# Patient Record
Sex: MALE | Race: WHITE | NOT HISPANIC OR LATINO | Employment: FULL TIME | URBAN - METROPOLITAN AREA
[De-identification: names, ages, dates, MRNs, and addresses within clinical notes are randomized per-mention and may not be internally consistent; named-entity substitution may affect disease eponyms.]

---

## 2017-08-29 DIAGNOSIS — M25.532 LEFT WRIST PAIN: Primary | ICD-10-CM

## 2017-08-30 ENCOUNTER — OFFICE VISIT (OUTPATIENT)
Dept: ORTHOPEDICS | Facility: CLINIC | Age: 47
End: 2017-08-30
Payer: COMMERCIAL

## 2017-08-30 ENCOUNTER — HOSPITAL ENCOUNTER (OUTPATIENT)
Dept: RADIOLOGY | Facility: HOSPITAL | Age: 47
Discharge: HOME | End: 2017-08-30
Attending: ORTHOPAEDIC SURGERY
Payer: COMMERCIAL

## 2017-08-30 VITALS — BODY MASS INDEX: 25.73 KG/M2 | HEIGHT: 72 IN | WEIGHT: 190 LBS

## 2017-08-30 DIAGNOSIS — M25.532 LEFT WRIST PAIN: ICD-10-CM

## 2017-08-30 DIAGNOSIS — M77.8 LEFT WRIST TENDINITIS: Primary | ICD-10-CM

## 2017-08-30 PROCEDURE — 73110 X-RAY EXAM OF WRIST: CPT | Mod: 26,LT,, | Performed by: RADIOLOGY

## 2017-08-30 PROCEDURE — 3008F BODY MASS INDEX DOCD: CPT | Mod: S$GLB,,, | Performed by: ORTHOPAEDIC SURGERY

## 2017-08-30 PROCEDURE — 99999 PR PBB SHADOW E&M-EST. PATIENT-LVL II: CPT | Mod: PBBFAC,,, | Performed by: ORTHOPAEDIC SURGERY

## 2017-08-30 PROCEDURE — 99203 OFFICE O/P NEW LOW 30 MIN: CPT | Mod: S$GLB,,, | Performed by: ORTHOPAEDIC SURGERY

## 2017-08-30 PROCEDURE — 73110 X-RAY EXAM OF WRIST: CPT | Mod: TC,PN,LT

## 2017-08-31 NOTE — PROGRESS NOTES
DATE: 8/30/2017  PATIENT: Stephen Deleon  REFERRING MD:   CHIEF COMPLAINT:   Chief Complaint   Patient presents with    Left Wrist - Pain       HISTORY:  Stephen Deleon is a 47 y.o. right hand dominant male  who presents for initial evaluation of left wrist pain.  He is employed as a .  He notes it 2 month history of discomfort which developed after using a weed eater for approximately 3 hours.  Since that time he's had random episodes of sharp stabbing pain on the dorsal radial wrist.  He states it occurs with extension and radial deviation.  It is irregular and not predictable.  It also occurs at sleep.  It last for a few seconds and then goes away.  He denies any previous injuries to his wrist.  He is not taking any medication for this problem.  Pain is reported at 0/10 currently    PAST MEDICAL/SURGICAL HISTORY:  History reviewed. No pertinent past medical history.  History reviewed. No pertinent surgical history.    Current Medications: No current outpatient prescriptions on file.    Family History: Noncontributory  Social History:   Social History     Social History    Marital status: Single     Spouse name: N/A    Number of children: N/A    Years of education: N/A     Occupational History    Not on file.     Social History Main Topics    Smoking status: Not on file    Smokeless tobacco: Not on file    Alcohol use Not on file    Drug use: Unknown    Sexual activity: Not on file     Other Topics Concern    Not on file     Social History Narrative    No narrative on file       ROS:  Constitution: Negative for chills, fever, and sweats. Negative for unexplained weight loss.  HENT: Negative for headaches and blurry vision.   Cardiovascular: Negative for chest pain, irregular heartbeat, leg swelling and palpitations.   Respiratory: Negative for cough and shortness of breath.   Gastrointestinal: Negative for abdominal pain, heartburn, nausea and vomiting.   Genitourinary: Negative  for bladder incontinence and dysuria.   Musculoskeletal: Negative for systemic arthritis, joint swelling, muscle weakness and myalgias.   Neurological: Negative for numbness.   Psychiatric/Behavioral: Negative for depression.   Endocrine: Negative for polyuria.   Hematologic/Lymphatic: Negative for bleeding disorders.  Skin: Negative for poor wound healing.       PHYSICAL EXAM:  Ht 6' (1.829 m)   Wt 86.2 kg (190 lb)   BMI 25.77 kg/m²   Healthy-appearing male in no acute distress.  Examination left wrist reveals no ecchymosis, swelling or effusion.  Range of motion of the wrist is full to flexion, extension, supination and pronation.  Sensation is intact to the radial, ulnar, median nerve distribution of the hand.  Motor strength within normal limits.  Negative Finkelstein's test.  Mild tenderness over the ECRL.  No pain with resisted wrist extension.  No pop or click elicited with range of motion of the wrist.      IMAGING:   X-rays a left wrist are performed and personally reviewed and compared to the radiologist's report.  No acute fractures, dislocations, bony or soft tissue abnormalities noted     ASSESSMENT:   Extensor carpi radialis longus tendinitis left wrist    PLAN:  The nature of the diagnosis, using models and diagrams when appropriate, was explained to the patient in detail.Treatment option discussed included observation, cortisone injection, wrist splinting, and MRI.. All questions answered and the patient wishes to observe his symptoms over the present time.  He'll try to identify the offending activity.  I recommended using a wrist brace at night to sleep as he reports this wasn't bothersome most.  Should he continue to remain symptomatic in 3-4 weeks, he'll return for consideration of cortisone injection versus MRI.  Follow-up if not improving or worse..      This note was dictated using voice recognition software and may contain grammatical errors

## 2019-08-30 DIAGNOSIS — K40.90 BUBONOCELE: Primary | ICD-10-CM

## 2019-09-04 ENCOUNTER — HOSPITAL ENCOUNTER (OUTPATIENT)
Dept: RADIOLOGY | Facility: HOSPITAL | Age: 49
Discharge: HOME | End: 2019-09-04
Attending: FAMILY MEDICINE
Payer: COMMERCIAL

## 2019-09-04 DIAGNOSIS — K40.90 BUBONOCELE: ICD-10-CM

## 2019-09-04 PROCEDURE — 76857 US EXAM PELVIC LIMITED: CPT | Mod: TC,PO

## 2019-09-09 ENCOUNTER — TELEPHONE (OUTPATIENT)
Dept: FAMILY MEDICINE | Facility: CLINIC | Age: 49
End: 2019-09-09

## 2019-09-09 NOTE — TELEPHONE ENCOUNTER
----- Message from Juanito Portillo MD sent at 9/8/2019  3:04 PM CDT -----  Call patient.  He does not have any inguinal hernias at this time.  Ultrasound shows 2 normal right inguinal lymph nodes.  No surgery is required at this time.

## 2019-09-10 NOTE — TELEPHONE ENCOUNTER
Spoke with pt and let him know per Dr. Portillo that the u/s does not show any hernias, just normal nodules. Pt verbalized understanding.

## 2019-09-13 ENCOUNTER — TELEPHONE (OUTPATIENT)
Dept: FAMILY MEDICINE | Facility: CLINIC | Age: 49
End: 2019-09-13

## 2019-09-13 NOTE — TELEPHONE ENCOUNTER
----- Message from Naty Valverde sent at 9/13/2019 10:32 AM CDT -----  Contact: Stephen  Patsy wants to be sen today. Body aches, head and chest congestion. [facility]  on .  pts # [phone]

## 2020-03-02 ENCOUNTER — OFFICE VISIT (OUTPATIENT)
Dept: FAMILY MEDICINE | Facility: CLINIC | Age: 50
End: 2020-03-02
Payer: COMMERCIAL

## 2020-03-02 VITALS
WEIGHT: 198 LBS | SYSTOLIC BLOOD PRESSURE: 108 MMHG | BODY MASS INDEX: 26.82 KG/M2 | DIASTOLIC BLOOD PRESSURE: 70 MMHG | HEIGHT: 72 IN | HEART RATE: 64 BPM

## 2020-03-02 DIAGNOSIS — Z00.00 ROUTINE PHYSICAL EXAMINATION: Primary | ICD-10-CM

## 2020-03-02 DIAGNOSIS — Z12.11 SCREENING FOR COLON CANCER: ICD-10-CM

## 2020-03-02 PROCEDURE — 99396 PR PREVENTIVE VISIT,EST,40-64: ICD-10-PCS | Mod: S$GLB,,, | Performed by: PHYSICIAN ASSISTANT

## 2020-03-02 PROCEDURE — 99396 PREV VISIT EST AGE 40-64: CPT | Mod: S$GLB,,, | Performed by: PHYSICIAN ASSISTANT

## 2020-03-02 NOTE — PROGRESS NOTES
SUBJECTIVE:    Patient ID: Stephen Deleon is a 50 y.o. male.    Chief Complaint: Follow-up (no medications //VM)    50-year-old male presents today for regular checkup. Reports that he has been doing well overall. Stays active. Knows that he is due for screening c-scope and PSA now. He has no new concerns for himself at this time. Now caring more for his parents who are declining physically.      No visits with results within 6 Month(s) from this visit.   Latest known visit with results is:   No results found for any previous visit.       History reviewed. No pertinent past medical history.  History reviewed. No pertinent surgical history.  History reviewed. No pertinent family history.    Marital Status:   Alcohol History:  reports that he drinks alcohol.  Tobacco History:  reports that he has never smoked. He does not have any smokeless tobacco history on file.  Drug History:  reports that he does not use drugs.    Review of patient's allergies indicates:  No Known Allergies  No current outpatient medications on file.    Review of Systems   Constitutional: Negative for activity change, fatigue, fever and unexpected weight change.   HENT: Negative for congestion.    Respiratory: Negative for apnea, cough, chest tightness and shortness of breath.    Cardiovascular: Negative for chest pain and palpitations.   Gastrointestinal: Negative for abdominal distention and abdominal pain.   Genitourinary: Negative for difficulty urinating and dysuria.   Musculoskeletal: Negative for arthralgias and back pain.   Neurological: Negative for dizziness and weakness.          Objective:      Vitals:    03/02/20 0837   BP: 108/70   Pulse: 64   Weight: 89.8 kg (198 lb)   Height: 6' (1.829 m)     Physical Exam   Constitutional: He is oriented to person, place, and time. He appears well-developed and well-nourished. No distress.   HENT:   Head: Normocephalic and atraumatic.   Eyes: Pupils are equal, round, and reactive to  light.   Neck: Normal range of motion. Neck supple. No thyromegaly present.   Cardiovascular: Normal rate, regular rhythm, normal heart sounds and intact distal pulses.   Pulmonary/Chest: Effort normal and breath sounds normal.   Abdominal: Soft. Bowel sounds are normal. He exhibits no distension. There is no tenderness.   Musculoskeletal: Normal range of motion.   Neurological: He is alert and oriented to person, place, and time. No cranial nerve deficit.   Skin: Skin is warm and dry. No rash noted. No erythema.         Assessment:       1. Routine physical examination    2. Screening for colon cancer         Plan:       Routine physical examination  -     CBC auto differential; Future; Expected date: 03/02/2020  -     Comprehensive metabolic panel; Future; Expected date: 03/02/2020  -     Lipid panel; Future; Expected date: 03/02/2020  -     TSH w/reflex to FT4; Future; Expected date: 03/02/2020  -     Urinalysis, Reflex to Urine Culture Urine, Clean Catch; Future; Expected date: 03/02/2020  -     PSA, Screening; Future; Expected date: 03/02/2020    Screening for colon cancer  -     Ambulatory referral/consult to Gastroenterology; Future; Expected date: 03/09/2020      Follow up in about 1 year (around 3/2/2021) for Annual Physical.        3/2/2020 Donta Urbina PA-C

## 2020-03-02 NOTE — PATIENT INSTRUCTIONS

## 2020-03-04 LAB
ALBUMIN SERPL-MCNC: 4.4 G/DL (ref 3.6–5.1)
ALBUMIN/GLOB SERPL: 1.8 (CALC) (ref 1–2.5)
ALP SERPL-CCNC: 92 U/L (ref 35–144)
ALT SERPL-CCNC: 19 U/L (ref 9–46)
APPEARANCE UR: CLEAR
AST SERPL-CCNC: 16 U/L (ref 10–35)
BACTERIA #/AREA URNS HPF: NORMAL /HPF
BACTERIA UR CULT: NORMAL
BASOPHILS # BLD AUTO: 18 CELLS/UL (ref 0–200)
BASOPHILS NFR BLD AUTO: 0.3 %
BILIRUB SERPL-MCNC: 0.7 MG/DL (ref 0.2–1.2)
BILIRUB UR QL STRIP: NEGATIVE
BUN SERPL-MCNC: 19 MG/DL (ref 7–25)
BUN/CREAT SERPL: NORMAL (CALC) (ref 6–22)
CALCIUM SERPL-MCNC: 9.4 MG/DL (ref 8.6–10.3)
CHLORIDE SERPL-SCNC: 104 MMOL/L (ref 98–110)
CHOLEST SERPL-MCNC: 210 MG/DL
CHOLEST/HDLC SERPL: 4 (CALC)
CO2 SERPL-SCNC: 29 MMOL/L (ref 20–32)
COLOR UR: YELLOW
CREAT SERPL-MCNC: 1 MG/DL (ref 0.7–1.33)
EOSINOPHIL # BLD AUTO: 150 CELLS/UL (ref 15–500)
EOSINOPHIL NFR BLD AUTO: 2.5 %
ERYTHROCYTE [DISTWIDTH] IN BLOOD BY AUTOMATED COUNT: 12.4 % (ref 11–15)
GFRSERPLBLD MDRD-ARVRAT: 87 ML/MIN/1.73M2
GLOBULIN SER CALC-MCNC: 2.4 G/DL (CALC) (ref 1.9–3.7)
GLUCOSE SERPL-MCNC: 91 MG/DL (ref 65–99)
GLUCOSE UR QL STRIP: NEGATIVE
HCT VFR BLD AUTO: 47 % (ref 38.5–50)
HDLC SERPL-MCNC: 52 MG/DL
HGB BLD-MCNC: 15.1 G/DL (ref 13.2–17.1)
HGB UR QL STRIP: NEGATIVE
HYALINE CASTS #/AREA URNS LPF: NORMAL /LPF
KETONES UR QL STRIP: NEGATIVE
LDLC SERPL CALC-MCNC: 139 MG/DL (CALC)
LEUKOCYTE ESTERASE UR QL STRIP: NEGATIVE
LYMPHOCYTES # BLD AUTO: 1998 CELLS/UL (ref 850–3900)
LYMPHOCYTES NFR BLD AUTO: 33.3 %
MCH RBC QN AUTO: 29.2 PG (ref 27–33)
MCHC RBC AUTO-ENTMCNC: 32.1 G/DL (ref 32–36)
MCV RBC AUTO: 90.9 FL (ref 80–100)
MONOCYTES # BLD AUTO: 516 CELLS/UL (ref 200–950)
MONOCYTES NFR BLD AUTO: 8.6 %
NEUTROPHILS # BLD AUTO: 3318 CELLS/UL (ref 1500–7800)
NEUTROPHILS NFR BLD AUTO: 55.3 %
NITRITE UR QL STRIP: NEGATIVE
NONHDLC SERPL-MCNC: 158 MG/DL (CALC)
PH UR STRIP: NORMAL [PH] (ref 5–8)
PLATELET # BLD AUTO: 155 THOUSAND/UL (ref 140–400)
PMV BLD REES-ECKER: 11.5 FL (ref 7.5–12.5)
POTASSIUM SERPL-SCNC: 4.2 MMOL/L (ref 3.5–5.3)
PROT SERPL-MCNC: 6.8 G/DL (ref 6.1–8.1)
PROT UR QL STRIP: NEGATIVE
PSA SERPL-MCNC: 0.4 NG/ML
RBC # BLD AUTO: 5.17 MILLION/UL (ref 4.2–5.8)
RBC #/AREA URNS HPF: NORMAL /HPF
SODIUM SERPL-SCNC: 142 MMOL/L (ref 135–146)
SP GR UR STRIP: 1.02 (ref 1–1.03)
SQUAMOUS #/AREA URNS HPF: NORMAL /HPF
TRIGL SERPL-MCNC: 90 MG/DL
TSH SERPL-ACNC: 1.94 MIU/L (ref 0.4–4.5)
WBC # BLD AUTO: 6 THOUSAND/UL (ref 3.8–10.8)
WBC #/AREA URNS HPF: NORMAL /HPF

## 2020-03-06 ENCOUNTER — TELEPHONE (OUTPATIENT)
Dept: FAMILY MEDICINE | Facility: CLINIC | Age: 50
End: 2020-03-06

## 2020-03-06 NOTE — TELEPHONE ENCOUNTER
----- Message from Donta Urbina PA-C sent at 3/5/2020  7:52 PM CST -----  Labs all look stable at this time. Continue as is and call me with any further questions. Keep up the good work!

## 2020-04-01 ENCOUNTER — TELEPHONE (OUTPATIENT)
Dept: FAMILY MEDICINE | Facility: CLINIC | Age: 50
End: 2020-04-01

## 2020-04-01 NOTE — TELEPHONE ENCOUNTER
Patient called back. Encouraged him to use portal and [employer]. Confirmed his email and sent link for portal. He also downloaded [employer] kimberly while we were on the phone.

## 2020-04-01 NOTE — TELEPHONE ENCOUNTER
From overdue results-encouraging patient to use portal due to Covid-19. LMOR to call Erica LOZANO, that we are encouraging use of portal and [employer] kimberly, and that we are doing virtual visits from the kimberly on a smart phone if it was warranted. Asked him to call so that I can give him info to set this up.

## 2021-05-24 DIAGNOSIS — M25.561 RIGHT KNEE PAIN, UNSPECIFIED CHRONICITY: Primary | ICD-10-CM

## 2021-05-31 ENCOUNTER — HOSPITAL ENCOUNTER (OUTPATIENT)
Dept: RADIOLOGY | Facility: HOSPITAL | Age: 51
Discharge: HOME | End: 2021-05-31
Attending: ORTHOPAEDIC SURGERY
Payer: COMMERCIAL

## 2021-05-31 ENCOUNTER — OFFICE VISIT (OUTPATIENT)
Dept: ORTHOPEDICS | Facility: CLINIC | Age: 51
End: 2021-05-31
Payer: COMMERCIAL

## 2021-05-31 VITALS — WEIGHT: 198 LBS | RESPIRATION RATE: 16 BRPM | HEIGHT: 72 IN | BODY MASS INDEX: 26.82 KG/M2

## 2021-05-31 DIAGNOSIS — M17.11 UNILATERAL PRIMARY OSTEOARTHRITIS, RIGHT KNEE: Primary | ICD-10-CM

## 2021-05-31 DIAGNOSIS — M25.561 RIGHT KNEE PAIN, UNSPECIFIED CHRONICITY: ICD-10-CM

## 2021-05-31 PROCEDURE — 73564 XR KNEE ORTHO RIGHT WITH FLEXION: ICD-10-PCS | Mod: 26,RT,, | Performed by: RADIOLOGY

## 2021-05-31 PROCEDURE — 73564 X-RAY EXAM KNEE 4 OR MORE: CPT | Mod: TC,PN,RT

## 2021-05-31 PROCEDURE — 99204 OFFICE O/P NEW MOD 45 MIN: CPT | Mod: S$GLB,,, | Performed by: ORTHOPAEDIC SURGERY

## 2021-05-31 PROCEDURE — 73564 X-RAY EXAM KNEE 4 OR MORE: CPT | Mod: 26,RT,, | Performed by: RADIOLOGY

## 2021-05-31 PROCEDURE — 99204 PR OFFICE/OUTPT VISIT, NEW, LEVL IV, 45-59 MIN: ICD-10-PCS | Mod: S$GLB,,, | Performed by: ORTHOPAEDIC SURGERY

## 2021-05-31 PROCEDURE — 73562 XR KNEE ORTHO RIGHT WITH FLEXION: ICD-10-PCS | Mod: 26,LT,, | Performed by: RADIOLOGY

## 2021-05-31 PROCEDURE — 3008F PR BODY MASS INDEX (BMI) DOCUMENTED: ICD-10-PCS | Mod: CPTII,S$GLB,, | Performed by: ORTHOPAEDIC SURGERY

## 2021-05-31 PROCEDURE — 73562 X-RAY EXAM OF KNEE 3: CPT | Mod: 26,LT,, | Performed by: RADIOLOGY

## 2021-05-31 PROCEDURE — 99999 PR PBB SHADOW E&M-EST. PATIENT-LVL II: CPT | Mod: PBBFAC,,, | Performed by: ORTHOPAEDIC SURGERY

## 2021-05-31 PROCEDURE — 99999 PR PBB SHADOW E&M-EST. PATIENT-LVL II: ICD-10-PCS | Mod: PBBFAC,,, | Performed by: ORTHOPAEDIC SURGERY

## 2021-05-31 PROCEDURE — 3008F BODY MASS INDEX DOCD: CPT | Mod: CPTII,S$GLB,, | Performed by: ORTHOPAEDIC SURGERY

## 2021-05-31 RX ORDER — KETOROLAC TROMETHAMINE 10 MG/1
10 TABLET, FILM COATED ORAL EVERY 6 HOURS
Qty: 20 TABLET | Refills: 0 | Status: SHIPPED | OUTPATIENT
Start: 2021-05-31 | End: 2021-06-05

## 2021-05-31 RX ORDER — METHYLPREDNISOLONE 4 MG/1
TABLET ORAL
Qty: 1 PACKAGE | Refills: 0 | Status: SHIPPED | OUTPATIENT
Start: 2021-05-31 | End: 2021-06-21

## 2021-07-01 ENCOUNTER — TELEPHONE (OUTPATIENT)
Dept: FAMILY MEDICINE | Facility: CLINIC | Age: 51
End: 2021-07-01

## 2021-07-01 ENCOUNTER — OFFICE VISIT (OUTPATIENT)
Dept: FAMILY MEDICINE | Facility: CLINIC | Age: 51
End: 2021-07-01
Payer: COMMERCIAL

## 2021-07-01 VITALS
BODY MASS INDEX: 26.82 KG/M2 | HEIGHT: 72 IN | SYSTOLIC BLOOD PRESSURE: 128 MMHG | DIASTOLIC BLOOD PRESSURE: 86 MMHG | WEIGHT: 198 LBS | HEART RATE: 64 BPM

## 2021-07-01 DIAGNOSIS — R10.2 PELVIC PAIN IN MALE: ICD-10-CM

## 2021-07-01 DIAGNOSIS — Z12.5 PROSTATE CANCER SCREENING: ICD-10-CM

## 2021-07-01 DIAGNOSIS — R39.9 LOWER URINARY TRACT SYMPTOMS (LUTS): ICD-10-CM

## 2021-07-01 DIAGNOSIS — Z00.00 GENERAL MEDICAL EXAM: Primary | ICD-10-CM

## 2021-07-01 PROCEDURE — 99213 OFFICE O/P EST LOW 20 MIN: CPT | Mod: S$GLB,,, | Performed by: NURSE PRACTITIONER

## 2021-07-01 PROCEDURE — 3008F PR BODY MASS INDEX (BMI) DOCUMENTED: ICD-10-PCS | Mod: S$GLB,,, | Performed by: NURSE PRACTITIONER

## 2021-07-01 PROCEDURE — 3008F BODY MASS INDEX DOCD: CPT | Mod: S$GLB,,, | Performed by: NURSE PRACTITIONER

## 2021-07-01 PROCEDURE — 1125F PR PAIN SEVERITY QUANTIFIED, PAIN PRESENT: ICD-10-PCS | Mod: S$GLB,,, | Performed by: NURSE PRACTITIONER

## 2021-07-01 PROCEDURE — 1125F AMNT PAIN NOTED PAIN PRSNT: CPT | Mod: S$GLB,,, | Performed by: NURSE PRACTITIONER

## 2021-07-01 PROCEDURE — 99213 PR OFFICE/OUTPT VISIT, EST, LEVL III, 20-29 MIN: ICD-10-PCS | Mod: S$GLB,,, | Performed by: NURSE PRACTITIONER

## 2021-07-03 LAB
ALBUMIN SERPL-MCNC: 4.2 G/DL (ref 3.6–5.1)
ALBUMIN/GLOB SERPL: 1.8 (CALC) (ref 1–2.5)
ALP SERPL-CCNC: 74 U/L (ref 35–144)
ALT SERPL-CCNC: 14 U/L (ref 9–46)
APPEARANCE UR: CLEAR
AST SERPL-CCNC: 15 U/L (ref 10–35)
BACTERIA #/AREA URNS HPF: NORMAL /HPF
BACTERIA UR CULT: NORMAL
BASOPHILS # BLD AUTO: 23 CELLS/UL (ref 0–200)
BASOPHILS NFR BLD AUTO: 0.4 %
BILIRUB SERPL-MCNC: 0.5 MG/DL (ref 0.2–1.2)
BILIRUB UR QL STRIP: NEGATIVE
BUN SERPL-MCNC: 18 MG/DL (ref 7–25)
BUN/CREAT SERPL: NORMAL (CALC) (ref 6–22)
CALCIUM SERPL-MCNC: 9.2 MG/DL (ref 8.6–10.3)
CHLORIDE SERPL-SCNC: 108 MMOL/L (ref 98–110)
CHOLEST SERPL-MCNC: 199 MG/DL
CHOLEST/HDLC SERPL: 4 (CALC)
CO2 SERPL-SCNC: 27 MMOL/L (ref 20–32)
COLOR UR: YELLOW
CREAT SERPL-MCNC: 0.91 MG/DL (ref 0.7–1.33)
EOSINOPHIL # BLD AUTO: 171 CELLS/UL (ref 15–500)
EOSINOPHIL NFR BLD AUTO: 3 %
ERYTHROCYTE [DISTWIDTH] IN BLOOD BY AUTOMATED COUNT: 12.1 % (ref 11–15)
GLOBULIN SER CALC-MCNC: 2.3 G/DL (CALC) (ref 1.9–3.7)
GLUCOSE SERPL-MCNC: 95 MG/DL (ref 65–99)
GLUCOSE UR QL STRIP: NEGATIVE
HCT VFR BLD AUTO: 45 % (ref 38.5–50)
HDLC SERPL-MCNC: 50 MG/DL
HGB BLD-MCNC: 14.9 G/DL (ref 13.2–17.1)
HGB UR QL STRIP: NEGATIVE
HYALINE CASTS #/AREA URNS LPF: NORMAL /LPF
KETONES UR QL STRIP: NEGATIVE
LDLC SERPL CALC-MCNC: 130 MG/DL (CALC)
LEUKOCYTE ESTERASE UR QL STRIP: NEGATIVE
LYMPHOCYTES # BLD AUTO: 2160 CELLS/UL (ref 850–3900)
LYMPHOCYTES NFR BLD AUTO: 37.9 %
MCH RBC QN AUTO: 29.8 PG (ref 27–33)
MCHC RBC AUTO-ENTMCNC: 33.1 G/DL (ref 32–36)
MCV RBC AUTO: 90 FL (ref 80–100)
MONOCYTES # BLD AUTO: 581 CELLS/UL (ref 200–950)
MONOCYTES NFR BLD AUTO: 10.2 %
NEUTROPHILS # BLD AUTO: 2765 CELLS/UL (ref 1500–7800)
NEUTROPHILS NFR BLD AUTO: 48.5 %
NITRITE UR QL STRIP: NEGATIVE
NONHDLC SERPL-MCNC: 149 MG/DL (CALC)
PH UR STRIP: NORMAL [PH] (ref 5–8)
PLATELET # BLD AUTO: 163 THOUSAND/UL (ref 140–400)
PMV BLD REES-ECKER: 11.6 FL (ref 7.5–12.5)
POTASSIUM SERPL-SCNC: 4 MMOL/L (ref 3.5–5.3)
PROT SERPL-MCNC: 6.5 G/DL (ref 6.1–8.1)
PROT UR QL STRIP: NEGATIVE
PSA SERPL-MCNC: 0.4 NG/ML
RBC # BLD AUTO: 5 MILLION/UL (ref 4.2–5.8)
RBC #/AREA URNS HPF: NORMAL /HPF
SODIUM SERPL-SCNC: 141 MMOL/L (ref 135–146)
SP GR UR STRIP: 1.03 (ref 1–1.03)
SQUAMOUS #/AREA URNS HPF: NORMAL /HPF
TRIGL SERPL-MCNC: 86 MG/DL
TSH SERPL-ACNC: 2.52 MIU/L (ref 0.4–4.5)
WBC # BLD AUTO: 5.7 THOUSAND/UL (ref 3.8–10.8)
WBC #/AREA URNS HPF: NORMAL /HPF

## 2021-07-09 ENCOUNTER — TELEPHONE (OUTPATIENT)
Dept: UROLOGY | Facility: CLINIC | Age: 51
End: 2021-07-09

## 2021-07-09 ENCOUNTER — TELEPHONE (OUTPATIENT)
Dept: FAMILY MEDICINE | Facility: CLINIC | Age: 51
End: 2021-07-09

## 2022-06-21 ENCOUNTER — TELEPHONE (OUTPATIENT)
Dept: FAMILY MEDICINE | Facility: CLINIC | Age: 52
End: 2022-06-21

## 2022-06-21 DIAGNOSIS — Z00.00 PHYSICAL EXAM: Primary | ICD-10-CM

## 2022-06-21 NOTE — TELEPHONE ENCOUNTER
----- Message from Catalina Martinez sent at 6/21/2022  9:53 AM CDT -----  Pt calling he scheduled a check up and wants to come maikel morning to do labs for that appt. Would like a call to confirm they have been ordered.  [phone]

## 2022-06-24 LAB
ALBUMIN SERPL-MCNC: 4.2 G/DL (ref 3.6–5.1)
ALBUMIN/GLOB SERPL: 1.9 (CALC) (ref 1–2.5)
ALP SERPL-CCNC: 77 U/L (ref 35–144)
ALT SERPL-CCNC: 14 U/L (ref 9–46)
APPEARANCE UR: CLEAR
AST SERPL-CCNC: 12 U/L (ref 10–35)
BACTERIA #/AREA URNS HPF: NORMAL /HPF
BACTERIA UR CULT: NORMAL
BASOPHILS # BLD AUTO: 17 CELLS/UL (ref 0–200)
BASOPHILS NFR BLD AUTO: 0.3 %
BILIRUB SERPL-MCNC: 0.5 MG/DL (ref 0.2–1.2)
BILIRUB UR QL STRIP: NEGATIVE
BUN SERPL-MCNC: 16 MG/DL (ref 7–25)
BUN/CREAT SERPL: NORMAL (CALC) (ref 6–22)
CALCIUM SERPL-MCNC: 9.3 MG/DL (ref 8.6–10.3)
CHLORIDE SERPL-SCNC: 106 MMOL/L (ref 98–110)
CHOLEST SERPL-MCNC: 194 MG/DL
CHOLEST/HDLC SERPL: 4.1 (CALC)
CO2 SERPL-SCNC: 26 MMOL/L (ref 20–32)
COLOR UR: YELLOW
CREAT SERPL-MCNC: 0.92 MG/DL (ref 0.7–1.33)
EOSINOPHIL # BLD AUTO: 151 CELLS/UL (ref 15–500)
EOSINOPHIL NFR BLD AUTO: 2.6 %
ERYTHROCYTE [DISTWIDTH] IN BLOOD BY AUTOMATED COUNT: 12.3 % (ref 11–15)
GLOBULIN SER CALC-MCNC: 2.2 G/DL (CALC) (ref 1.9–3.7)
GLUCOSE SERPL-MCNC: 97 MG/DL (ref 65–99)
GLUCOSE UR QL STRIP: NEGATIVE
HCT VFR BLD AUTO: 45.2 % (ref 38.5–50)
HDLC SERPL-MCNC: 47 MG/DL
HGB BLD-MCNC: 14.8 G/DL (ref 13.2–17.1)
HGB UR QL STRIP: NEGATIVE
HYALINE CASTS #/AREA URNS LPF: NORMAL /LPF
KETONES UR QL STRIP: NEGATIVE
LDLC SERPL CALC-MCNC: 126 MG/DL (CALC)
LEUKOCYTE ESTERASE UR QL STRIP: NEGATIVE
LYMPHOCYTES # BLD AUTO: 2140 CELLS/UL (ref 850–3900)
LYMPHOCYTES NFR BLD AUTO: 36.9 %
MCH RBC QN AUTO: 30.6 PG (ref 27–33)
MCHC RBC AUTO-ENTMCNC: 32.7 G/DL (ref 32–36)
MCV RBC AUTO: 93.4 FL (ref 80–100)
MONOCYTES # BLD AUTO: 551 CELLS/UL (ref 200–950)
MONOCYTES NFR BLD AUTO: 9.5 %
NEUTROPHILS # BLD AUTO: 2941 CELLS/UL (ref 1500–7800)
NEUTROPHILS NFR BLD AUTO: 50.7 %
NITRITE UR QL STRIP: NEGATIVE
NONHDLC SERPL-MCNC: 147 MG/DL (CALC)
PH UR STRIP: 6 [PH] (ref 5–8)
PLATELET # BLD AUTO: 150 THOUSAND/UL (ref 140–400)
PMV BLD REES-ECKER: 11.5 FL (ref 7.5–12.5)
POTASSIUM SERPL-SCNC: 4.1 MMOL/L (ref 3.5–5.3)
PROT SERPL-MCNC: 6.4 G/DL (ref 6.1–8.1)
PROT UR QL STRIP: NEGATIVE
PSA SERPL-MCNC: 0.3 NG/ML
RBC # BLD AUTO: 4.84 MILLION/UL (ref 4.2–5.8)
RBC #/AREA URNS HPF: NORMAL /HPF
SERVICE CMNT-IMP: NORMAL
SODIUM SERPL-SCNC: 140 MMOL/L (ref 135–146)
SP GR UR STRIP: 1.02 (ref 1–1.03)
SQUAMOUS #/AREA URNS HPF: NORMAL /HPF
TRIGL SERPL-MCNC: 106 MG/DL
TSH SERPL-ACNC: 2.45 MIU/L (ref 0.4–4.5)
WBC # BLD AUTO: 5.8 THOUSAND/UL (ref 3.8–10.8)
WBC #/AREA URNS HPF: NORMAL /HPF

## 2022-06-28 ENCOUNTER — OFFICE VISIT (OUTPATIENT)
Dept: FAMILY MEDICINE | Facility: CLINIC | Age: 52
End: 2022-06-28
Payer: COMMERCIAL

## 2022-06-28 ENCOUNTER — TELEPHONE (OUTPATIENT)
Dept: FAMILY MEDICINE | Facility: CLINIC | Age: 52
End: 2022-06-28

## 2022-06-28 DIAGNOSIS — Z00.00 ROUTINE PHYSICAL EXAMINATION: Primary | ICD-10-CM

## 2022-06-28 PROCEDURE — 99396 PREV VISIT EST AGE 40-64: CPT | Mod: 95,,, | Performed by: PHYSICIAN ASSISTANT

## 2022-06-28 PROCEDURE — 99396 PR PREVENTIVE VISIT,EST,40-64: ICD-10-PCS | Mod: 95,,, | Performed by: PHYSICIAN ASSISTANT

## 2022-06-28 NOTE — TELEPHONE ENCOUNTER
----- Message from Missy Preez sent at 6/28/2022  9:28 AM CDT -----  Patient called and would like to know if he can change his appointment that is at 11 today to a virtual appointment please give him a call at [phone]

## 2023-05-08 ENCOUNTER — TELEPHONE (OUTPATIENT)
Dept: FAMILY MEDICINE | Facility: CLINIC | Age: 53
End: 2023-05-08

## 2023-05-08 DIAGNOSIS — Z00.00 ROUTINE GENERAL MEDICAL EXAMINATION AT A HEALTH CARE FACILITY: ICD-10-CM

## 2023-05-08 DIAGNOSIS — Z79.899 ENCOUNTER FOR LONG-TERM (CURRENT) USE OF OTHER MEDICATIONS: Primary | ICD-10-CM

## 2023-05-08 DIAGNOSIS — Z12.5 PROSTATE CANCER SCREENING: ICD-10-CM

## 2023-05-08 NOTE — TELEPHONE ENCOUNTER
----- Message from Suzanne Mcdonnell MA sent at 5/8/2023  3:22 PM CDT -----  Regarding: would like labs ordered  Patient appt with mao 6/29/23. Would like labs ordered before visit  [facility]  [phone]

## 2023-06-28 LAB
ALBUMIN SERPL-MCNC: 4.3 G/DL (ref 3.6–5.1)
ALBUMIN/GLOB SERPL: 1.8 (CALC) (ref 1–2.5)
ALP SERPL-CCNC: 71 U/L (ref 35–144)
ALT SERPL-CCNC: 25 U/L (ref 9–46)
APPEARANCE UR: CLEAR
AST SERPL-CCNC: 16 U/L (ref 10–35)
BACTERIA #/AREA URNS HPF: NORMAL /HPF
BACTERIA UR CULT: NORMAL
BASOPHILS # BLD AUTO: 33 CELLS/UL (ref 0–200)
BASOPHILS NFR BLD AUTO: 0.5 %
BILIRUB SERPL-MCNC: 0.5 MG/DL (ref 0.2–1.2)
BILIRUB UR QL STRIP: NEGATIVE
BUN SERPL-MCNC: 16 MG/DL (ref 7–25)
BUN/CREAT SERPL: NORMAL (CALC) (ref 6–22)
CALCIUM SERPL-MCNC: 9.4 MG/DL (ref 8.6–10.3)
CHLORIDE SERPL-SCNC: 107 MMOL/L (ref 98–110)
CHOLEST SERPL-MCNC: 201 MG/DL
CHOLEST/HDLC SERPL: 3.9 (CALC)
CO2 SERPL-SCNC: 22 MMOL/L (ref 20–32)
COLOR UR: YELLOW
CREAT SERPL-MCNC: 0.92 MG/DL (ref 0.7–1.3)
EGFR: 99 ML/MIN/1.73M2
EOSINOPHIL # BLD AUTO: 198 CELLS/UL (ref 15–500)
EOSINOPHIL NFR BLD AUTO: 3 %
ERYTHROCYTE [DISTWIDTH] IN BLOOD BY AUTOMATED COUNT: 12.6 % (ref 11–15)
GLOBULIN SER CALC-MCNC: 2.4 G/DL (CALC) (ref 1.9–3.7)
GLUCOSE SERPL-MCNC: 91 MG/DL (ref 65–99)
GLUCOSE UR QL STRIP: NEGATIVE
HCT VFR BLD AUTO: 44.3 % (ref 38.5–50)
HDLC SERPL-MCNC: 51 MG/DL
HGB BLD-MCNC: 15.3 G/DL (ref 13.2–17.1)
HGB UR QL STRIP: NEGATIVE
HYALINE CASTS #/AREA URNS LPF: NORMAL /LPF
KETONES UR QL STRIP: NEGATIVE
LDLC SERPL CALC-MCNC: 133 MG/DL (CALC)
LEUKOCYTE ESTERASE UR QL STRIP: NEGATIVE
LYMPHOCYTES # BLD AUTO: 2033 CELLS/UL (ref 850–3900)
LYMPHOCYTES NFR BLD AUTO: 30.8 %
MCH RBC QN AUTO: 32.6 PG (ref 27–33)
MCHC RBC AUTO-ENTMCNC: 34.5 G/DL (ref 32–36)
MCV RBC AUTO: 94.5 FL (ref 80–100)
MONOCYTES # BLD AUTO: 568 CELLS/UL (ref 200–950)
MONOCYTES NFR BLD AUTO: 8.6 %
NEUTROPHILS # BLD AUTO: 3769 CELLS/UL (ref 1500–7800)
NEUTROPHILS NFR BLD AUTO: 57.1 %
NITRITE UR QL STRIP: NEGATIVE
NONHDLC SERPL-MCNC: 150 MG/DL (CALC)
PH UR STRIP: NORMAL [PH] (ref 5–8)
PLATELET # BLD AUTO: 187 THOUSAND/UL (ref 140–400)
PMV BLD REES-ECKER: 13.6 FL (ref 7.5–12.5)
POTASSIUM SERPL-SCNC: 4 MMOL/L (ref 3.5–5.3)
PROT SERPL-MCNC: 6.7 G/DL (ref 6.1–8.1)
PROT UR QL STRIP: NEGATIVE
PSA SERPL-MCNC: 0.32 NG/ML
RBC # BLD AUTO: 4.69 MILLION/UL (ref 4.2–5.8)
RBC #/AREA URNS HPF: NORMAL /HPF
SERVICE CMNT-IMP: NORMAL
SODIUM SERPL-SCNC: 140 MMOL/L (ref 135–146)
SP GR UR STRIP: 1.02 (ref 1–1.03)
SQUAMOUS #/AREA URNS HPF: NORMAL /HPF
TRIGL SERPL-MCNC: 76 MG/DL
TSH SERPL-ACNC: 2.19 MIU/L (ref 0.4–4.5)
WBC # BLD AUTO: 6.6 THOUSAND/UL (ref 3.8–10.8)
WBC #/AREA URNS HPF: NORMAL /HPF

## 2023-06-29 ENCOUNTER — OFFICE VISIT (OUTPATIENT)
Dept: FAMILY MEDICINE | Facility: CLINIC | Age: 53
End: 2023-06-29
Payer: COMMERCIAL

## 2023-06-29 VITALS
WEIGHT: 201 LBS | DIASTOLIC BLOOD PRESSURE: 82 MMHG | HEART RATE: 80 BPM | BODY MASS INDEX: 27.22 KG/M2 | HEIGHT: 72 IN | SYSTOLIC BLOOD PRESSURE: 132 MMHG

## 2023-06-29 DIAGNOSIS — Z00.00 ROUTINE PHYSICAL EXAMINATION: Primary | ICD-10-CM

## 2023-06-29 PROCEDURE — 1159F MED LIST DOCD IN RCRD: CPT | Mod: CPTII,S$GLB,, | Performed by: PHYSICIAN ASSISTANT

## 2023-06-29 PROCEDURE — 3008F BODY MASS INDEX DOCD: CPT | Mod: CPTII,S$GLB,, | Performed by: PHYSICIAN ASSISTANT

## 2023-06-29 PROCEDURE — 3079F DIAST BP 80-89 MM HG: CPT | Mod: CPTII,S$GLB,, | Performed by: PHYSICIAN ASSISTANT

## 2023-06-29 PROCEDURE — 3075F SYST BP GE 130 - 139MM HG: CPT | Mod: CPTII,S$GLB,, | Performed by: PHYSICIAN ASSISTANT

## 2023-06-29 PROCEDURE — 3008F PR BODY MASS INDEX (BMI) DOCUMENTED: ICD-10-PCS | Mod: CPTII,S$GLB,, | Performed by: PHYSICIAN ASSISTANT

## 2023-06-29 PROCEDURE — 99396 PR PREVENTIVE VISIT,EST,40-64: ICD-10-PCS | Mod: S$GLB,,, | Performed by: PHYSICIAN ASSISTANT

## 2023-06-29 PROCEDURE — 3079F PR MOST RECENT DIASTOLIC BLOOD PRESSURE 80-89 MM HG: ICD-10-PCS | Mod: CPTII,S$GLB,, | Performed by: PHYSICIAN ASSISTANT

## 2023-06-29 PROCEDURE — 99396 PREV VISIT EST AGE 40-64: CPT | Mod: S$GLB,,, | Performed by: PHYSICIAN ASSISTANT

## 2023-06-29 PROCEDURE — 3075F PR MOST RECENT SYSTOLIC BLOOD PRESS GE 130-139MM HG: ICD-10-PCS | Mod: CPTII,S$GLB,, | Performed by: PHYSICIAN ASSISTANT

## 2023-06-29 PROCEDURE — 1159F PR MEDICATION LIST DOCUMENTED IN MEDICAL RECORD: ICD-10-PCS | Mod: CPTII,S$GLB,, | Performed by: PHYSICIAN ASSISTANT

## 2023-06-29 NOTE — PROGRESS NOTES
SUBJECTIVE:    Patient ID: Stephen Deleon III is a 53 y.o. male.    Chief Complaint: Annual Exam (Takes no rx meds// SW)    This is a 53-year-old male who presents today for annual checkup.  Just completed annual labs for review.  Up-to-date on all screening measures.  Had colonoscopy in 2021 and was given 5 years to return for screening. Aware that his cholesterol is borderline higher than we would like. Maybe open to statin in the future. Just not now. No new concerns.      Telephone on 05/08/2023   Component Date Value Ref Range Status    WBC 06/27/2023 6.6  3.8 - 10.8 Thousand/uL Final    RBC 06/27/2023 4.69  4.20 - 5.80 Million/uL Final    Hemoglobin 06/27/2023 15.3  13.2 - 17.1 g/dL Final    Hematocrit 06/27/2023 44.3  38.5 - 50.0 % Final    MCV 06/27/2023 94.5  80.0 - 100.0 fL Final    MCH 06/27/2023 32.6  27.0 - 33.0 pg Final    MCHC 06/27/2023 34.5  32.0 - 36.0 g/dL Final    RDW 06/27/2023 12.6  11.0 - 15.0 % Final    Platelets 06/27/2023 187  140 - 400 Thousand/uL Final    MPV 06/27/2023 13.6 (H)  7.5 - 12.5 fL Final    Neutrophils, Abs 06/27/2023 3,769  1,500 - 7,800 cells/uL Final    Lymph # 06/27/2023 2,033  850 - 3,900 cells/uL Final    Mono # 06/27/2023 568  200 - 950 cells/uL Final    Eos # 06/27/2023 198  15 - 500 cells/uL Final    Baso # 06/27/2023 33  0 - 200 cells/uL Final    Neutrophils Relative 06/27/2023 57.1  % Final    Lymph % 06/27/2023 30.8  % Final    Mono % 06/27/2023 8.6  % Final    Eosinophil % 06/27/2023 3.0  % Final    Basophil % 06/27/2023 0.5  % Final    Glucose 06/27/2023 91  65 - 99 mg/dL Final    BUN 06/27/2023 16  7 - 25 mg/dL Final    Creatinine 06/27/2023 0.92  0.70 - 1.30 mg/dL Final    eGFR 06/27/2023 99  > OR = 60 mL/min/1.73m2 Final    BUN/Creatinine Ratio 06/27/2023 NOT APPLICABLE  6 - 22 (calc) Final    Sodium 06/27/2023 140  135 - 146 mmol/L Final    Potassium 06/27/2023 4.0  3.5 - 5.3 mmol/L Final    Chloride 06/27/2023 107  98 - 110 mmol/L Final    CO2  06/27/2023 22  20 - 32 mmol/L Final    Calcium 06/27/2023 9.4  8.6 - 10.3 mg/dL Final    Total Protein 06/27/2023 6.7  6.1 - 8.1 g/dL Final    Albumin 06/27/2023 4.3  3.6 - 5.1 g/dL Final    Globulin, Total 06/27/2023 2.4  1.9 - 3.7 g/dL (calc) Final    Albumin/Globulin Ratio 06/27/2023 1.8  1.0 - 2.5 (calc) Final    Total Bilirubin 06/27/2023 0.5  0.2 - 1.2 mg/dL Final    Alkaline Phosphatase 06/27/2023 71  35 - 144 U/L Final    AST 06/27/2023 16  10 - 35 U/L Final    ALT 06/27/2023 25  9 - 46 U/L Final    Cholesterol 06/27/2023 201 (H)  <200 mg/dL Final    HDL 06/27/2023 51  > OR = 40 mg/dL Final    Triglycerides 06/27/2023 76  <150 mg/dL Final    LDL Cholesterol 06/27/2023 133 (H)  mg/dL (calc) Final    HDL/Cholesterol Ratio 06/27/2023 3.9  <5.0 (calc) Final    Non HDL Chol. (LDL+VLDL) 06/27/2023 150 (H)  <130 mg/dL (calc) Final    PROSTATE SPECIFIC ANTIGEN, SCR - Q* 06/27/2023 0.32  < OR = 4.00 ng/mL Final    Color, UA 06/27/2023 YELLOW  YELLOW Final    Appearance, UA 06/27/2023 CLEAR  CLEAR Final    Specific Gravity, UA 06/27/2023 1.023  1.001 - 1.035 Final    pH, UA 06/27/2023 < OR = 5.0  5.0 - 8.0 Final    Glucose, UA 06/27/2023 NEGATIVE  NEGATIVE Final    Bilirubin, UA 06/27/2023 NEGATIVE  NEGATIVE Final    Ketones, UA 06/27/2023 NEGATIVE  NEGATIVE Final    Occult Blood UA 06/27/2023 NEGATIVE  NEGATIVE Final    Protein, UA 06/27/2023 NEGATIVE  NEGATIVE Final    Nitrite, UA 06/27/2023 NEGATIVE  NEGATIVE Final    Leukocytes, UA 06/27/2023 NEGATIVE  NEGATIVE Final    WBC Casts, UA 06/27/2023 NONE SEEN  < OR = 5 /HPF Final    RBC Casts, UA 06/27/2023 NONE SEEN  < OR = 2 /HPF Final    Squam Epithel, UA 06/27/2023 NONE SEEN  < OR = 5 /HPF Final    Bacteria, UA 06/27/2023 NONE SEEN  NONE SEEN /HPF Final    Hyaline Casts, UA 06/27/2023 NONE SEEN  NONE SEEN /LPF Final    Service Cmt: 06/27/2023    Final    Reflexive Urine Culture 06/27/2023    Final    TSH w/reflex to FT4 06/27/2023 2.19  0.40 - 4.50 mIU/L Final        History reviewed. No pertinent past medical history.  History reviewed. No pertinent surgical history.  History reviewed. No pertinent family history.    Marital Status:   Alcohol History:  reports current alcohol use.  Tobacco History:  reports that he has never smoked. He has never been exposed to tobacco smoke. He does not have any smokeless tobacco history on file.  Drug History:  reports no history of drug use.    Review of patient's allergies indicates:  No Known Allergies  No current outpatient medications on file.    Review of Systems   Constitutional:  Negative for activity change, fatigue, fever and unexpected weight change.   HENT:  Negative for congestion.    Respiratory:  Negative for apnea, cough, chest tightness and shortness of breath.    Cardiovascular:  Negative for chest pain and palpitations.   Gastrointestinal:  Negative for abdominal distention and abdominal pain.   Genitourinary:  Negative for difficulty urinating and dysuria.   Musculoskeletal:  Negative for arthralgias and back pain.   Neurological:  Negative for dizziness and weakness.        Objective:      Vitals:    06/29/23 0804   BP: 132/82   Pulse: 80   Weight: 91.2 kg (201 lb)   Height: 6' (1.829 m)     Physical Exam  Constitutional:       General: He is not in acute distress.     Appearance: He is well-developed.   HENT:      Head: Normocephalic and atraumatic.   Eyes:      Pupils: Pupils are equal, round, and reactive to light.   Neck:      Thyroid: No thyromegaly.   Cardiovascular:      Rate and Rhythm: Normal rate and regular rhythm.      Heart sounds: Normal heart sounds.   Pulmonary:      Effort: Pulmonary effort is normal.      Breath sounds: Normal breath sounds.   Abdominal:      General: Bowel sounds are normal. There is no distension.      Palpations: Abdomen is soft.      Tenderness: There is no abdominal tenderness.   Musculoskeletal:         General: Normal range of motion.      Cervical back: Normal range  of motion and neck supple.   Skin:     General: Skin is warm and dry.      Findings: No erythema or rash.   Neurological:      Mental Status: He is alert and oriented to person, place, and time.      Cranial Nerves: No cranial nerve deficit.         Assessment:       1. Routine physical examination         Plan:       Routine physical examination  Comments:  Healthy individual. Labs reviewed. we did discuss potentially adding statin in the future if cholesterol continues to be high. f/u in one year      Follow up in about 1 year (around 6/29/2024) for Annual Physical.        6/29/2023 Donta Urbina PA-C

## 2023-07-18 ENCOUNTER — PATIENT MESSAGE (OUTPATIENT)
Dept: FAMILY MEDICINE | Facility: CLINIC | Age: 53
End: 2023-07-18

## 2023-07-25 ENCOUNTER — TELEPHONE (OUTPATIENT)
Dept: FAMILY MEDICINE | Facility: CLINIC | Age: 53
End: 2023-07-25

## 2023-07-25 DIAGNOSIS — Z13.6 ENCOUNTER FOR SCREENING FOR CARDIOVASCULAR DISORDERS: Primary | ICD-10-CM

## 2023-07-25 DIAGNOSIS — Z82.49 FAMILY HISTORY OF HEART DISEASE: ICD-10-CM

## 2023-07-25 NOTE — TELEPHONE ENCOUNTER
----- Message from Missy Perez sent at 7/25/2023 11:38 AM CDT -----  Patient called and stated he want to have the cardiac calcium screening and Diagnostic imaging advised him that he need to have an order from his doctor. He stated that he would like to have the test done ASAP. Please give him a call and advise when the order is sent so he can schedule the appointment. [phone]

## 2023-07-25 NOTE — TELEPHONE ENCOUNTER
Pt states he spoke with Omar at his last OV. Pt brother had abnormal Cardiac calcium Scoring at [facility]. Pt states he is paying for this out of pocket to [facility]. Are you ok to put the orders in? Pt aware omar is out of the office.

## 2023-08-06 ENCOUNTER — TELEPHONE (OUTPATIENT)
Dept: FAMILY MEDICINE | Facility: CLINIC | Age: 53
End: 2023-08-06

## 2023-08-06 NOTE — TELEPHONE ENCOUNTER
Call patient and let him know that his Calcium score CT looks great! He has no coronary calcification seen.

## 2023-10-31 ENCOUNTER — HOSPITAL ENCOUNTER (OUTPATIENT)
Dept: RADIOLOGY | Facility: HOSPITAL | Age: 53
Discharge: HOME | End: 2023-10-31
Attending: ORTHOPAEDIC SURGERY
Payer: COMMERCIAL

## 2023-10-31 ENCOUNTER — OFFICE VISIT (OUTPATIENT)
Dept: ORTHOPEDICS | Facility: CLINIC | Age: 53
End: 2023-10-31
Payer: COMMERCIAL

## 2023-10-31 VITALS — HEIGHT: 72 IN | BODY MASS INDEX: 27.23 KG/M2 | WEIGHT: 201.06 LBS

## 2023-10-31 DIAGNOSIS — M25.552 PAIN OF LEFT HIP: Primary | ICD-10-CM

## 2023-10-31 DIAGNOSIS — S76.012A STRAIN OF GLUTEUS MEDIUS, LEFT, INITIAL ENCOUNTER: Primary | ICD-10-CM

## 2023-10-31 DIAGNOSIS — M25.552 PAIN OF LEFT HIP: ICD-10-CM

## 2023-10-31 PROCEDURE — 99214 OFFICE O/P EST MOD 30 MIN: CPT | Mod: S$GLB,,, | Performed by: ORTHOPAEDIC SURGERY

## 2023-10-31 PROCEDURE — 3008F PR BODY MASS INDEX (BMI) DOCUMENTED: ICD-10-PCS | Mod: CPTII,S$GLB,, | Performed by: ORTHOPAEDIC SURGERY

## 2023-10-31 PROCEDURE — 73502 XR HIP WITH PELVIS WHEN PERFORMED, 2 OR 3 VIEWS LEFT: ICD-10-PCS | Mod: 26,LT,, | Performed by: RADIOLOGY

## 2023-10-31 PROCEDURE — 73502 X-RAY EXAM HIP UNI 2-3 VIEWS: CPT | Mod: 26,LT,, | Performed by: RADIOLOGY

## 2023-10-31 PROCEDURE — 73502 X-RAY EXAM HIP UNI 2-3 VIEWS: CPT | Mod: TC,PO,LT

## 2023-10-31 PROCEDURE — 1159F MED LIST DOCD IN RCRD: CPT | Mod: CPTII,S$GLB,, | Performed by: ORTHOPAEDIC SURGERY

## 2023-10-31 PROCEDURE — 1160F RVW MEDS BY RX/DR IN RCRD: CPT | Mod: CPTII,S$GLB,, | Performed by: ORTHOPAEDIC SURGERY

## 2023-10-31 PROCEDURE — 99999 PR PBB SHADOW E&M-EST. PATIENT-LVL III: ICD-10-PCS | Mod: PBBFAC,,, | Performed by: ORTHOPAEDIC SURGERY

## 2023-10-31 PROCEDURE — 99214 PR OFFICE/OUTPT VISIT, EST, LEVL IV, 30-39 MIN: ICD-10-PCS | Mod: S$GLB,,, | Performed by: ORTHOPAEDIC SURGERY

## 2023-10-31 PROCEDURE — 3008F BODY MASS INDEX DOCD: CPT | Mod: CPTII,S$GLB,, | Performed by: ORTHOPAEDIC SURGERY

## 2023-10-31 PROCEDURE — 1160F PR REVIEW ALL MEDS BY PRESCRIBER/CLIN PHARMACIST DOCUMENTED: ICD-10-PCS | Mod: CPTII,S$GLB,, | Performed by: ORTHOPAEDIC SURGERY

## 2023-10-31 PROCEDURE — 99999 PR PBB SHADOW E&M-EST. PATIENT-LVL III: CPT | Mod: PBBFAC,,, | Performed by: ORTHOPAEDIC SURGERY

## 2023-10-31 PROCEDURE — 1159F PR MEDICATION LIST DOCUMENTED IN MEDICAL RECORD: ICD-10-PCS | Mod: CPTII,S$GLB,, | Performed by: ORTHOPAEDIC SURGERY

## 2023-10-31 RX ORDER — KETOROLAC TROMETHAMINE 10 MG/1
10 TABLET, FILM COATED ORAL EVERY 6 HOURS
Qty: 20 TABLET | Refills: 0 | Status: SHIPPED | OUTPATIENT
Start: 2023-10-31 | End: 2023-11-05

## 2023-10-31 RX ORDER — METHYLPREDNISOLONE 4 MG/1
TABLET ORAL
Qty: 1 EACH | Refills: 0 | Status: SHIPPED | OUTPATIENT
Start: 2023-10-31 | End: 2023-11-21

## 2023-10-31 NOTE — PROGRESS NOTES
CC:  53-year-old male presents for evaluation of left hip pain.  The patient reports he was at a wedding about a week ago and did some dancing in the next day noticed some pain around his left hip.  He then did some housework he went fishing and became acutely worse.  He states he woke up this morning with pain he rated as an 8/10.  He is had around with the ice packs for a couple of hours before he can get going.  When asked to point to his pain he points to his left lateral hip and pelvic brim.    History reviewed. No pertinent past medical history.    History reviewed. No pertinent surgical history.    No current outpatient medications on file prior to visit.     No current facility-administered medications on file prior to visit.       ROS:    Constitution: Denies chills, fever, and sweats.  HENT: Denies headaches or blurry vision.  Cardiovascular: Denies chest pain or irregular heart beat.  Respiratory: Denies cough or shortness of breath.  Gastrointestinal: Denies abdominal pain, nausea, or vomiting.  Genitourinary:  Denies urinary incontinence, bladder and kidney issues  Musculoskeletal:  Denies muscle cramps.  Neurological: Denies dizziness or focal weakness.  Psychiatric/Behavioral: Normal mental status.  Hematologic/Lymphatic: Denies bleeding problem or easy bruising/bleeding.  Skin: Denies rash or suspicious lesions.    Physical examination     Gen - No acute distress, well nourished, well groomed   Eyes - Extraoccular motions intact, pupils equally round and reactive to light and accommodation   ENT - normocephalic, atruamtic, oropharynx clear   Neck - Supple, no abnormal masses   Cardiovascular - regular rate and rhythm   Pulmonary - clear to auscultation bilaterally, no wheezes, ronchi, or rales   Abdomen - soft, non-tender, non-distended, positive bowel sounds   Psych - The patient is alert and oriented x3 with normal mood and affect    Examination of the Left Lower Extremity    Skin is intact  throughout  Motor in intact EHL,FHL,TA,mich  +2 DP/PT  Sensation LT intact D/P/1st    Examination of the Left Hip    C-Sign negative  Logroll negative  Stenchfield negative    Pain with ROM negative    ROM:    Flexion   120  Extension   30  Abduction   45  Adduction   20  External Rotation 45  Internal Rotation 35    Flexion contracture negative    FADIR negative  FADER negative    Tenderness to palpation over lateral and posterolateral greater tochanter negative    X-ray images were examined and personally interpreted by me.  Three views of the left hip dated 10/31/2023 show the femoral head well reduced in the acetabulum with joint space maintained.  No acute fractures and no advanced arthritic changes.    Dx:  The patient has pain mostly is over his pelvic brim and gluteus medius.  He does not have tenderness over his greater trochanter.  It seems this is most likely a muscle strain of the gluteus medius    Plan:  A send in a prescription for a pulse dose of steroids combined with 5 days of Toradol.  That should cool off the acute inflammation.  Follow up in 1 week for re-evaluation.

## 2024-04-30 ENCOUNTER — TELEPHONE (OUTPATIENT)
Dept: FAMILY MEDICINE | Facility: CLINIC | Age: 54
End: 2024-04-30
Payer: COMMERCIAL

## 2024-04-30 DIAGNOSIS — Z79.899 ENCOUNTER FOR LONG-TERM (CURRENT) USE OF OTHER MEDICATIONS: Primary | ICD-10-CM

## 2024-04-30 DIAGNOSIS — Z00.00 ROUTINE GENERAL MEDICAL EXAMINATION AT A HEALTH CARE FACILITY: ICD-10-CM

## 2024-05-02 LAB
ALBUMIN SERPL-MCNC: 4.4 G/DL (ref 3.6–5.1)
ALBUMIN/GLOB SERPL: 1.9 (CALC) (ref 1–2.5)
ALP SERPL-CCNC: 88 U/L (ref 35–144)
ALT SERPL-CCNC: 14 U/L (ref 9–46)
APPEARANCE UR: CLEAR
AST SERPL-CCNC: 12 U/L (ref 10–35)
BACTERIA #/AREA URNS HPF: NORMAL /HPF
BACTERIA UR CULT: NORMAL
BASOPHILS # BLD AUTO: 33 CELLS/UL (ref 0–200)
BASOPHILS NFR BLD AUTO: 0.5 %
BILIRUB SERPL-MCNC: 0.7 MG/DL (ref 0.2–1.2)
BILIRUB UR QL STRIP: NEGATIVE
BUN SERPL-MCNC: 16 MG/DL (ref 7–25)
BUN/CREAT SERPL: NORMAL (CALC) (ref 6–22)
CALCIUM SERPL-MCNC: 9.2 MG/DL (ref 8.6–10.3)
CHLORIDE SERPL-SCNC: 106 MMOL/L (ref 98–110)
CHOLEST SERPL-MCNC: 205 MG/DL
CHOLEST/HDLC SERPL: 4.1 (CALC)
CO2 SERPL-SCNC: 25 MMOL/L (ref 20–32)
COLOR UR: YELLOW
CREAT SERPL-MCNC: 0.95 MG/DL (ref 0.7–1.3)
EGFR: 95 ML/MIN/1.73M2
EOSINOPHIL # BLD AUTO: 182 CELLS/UL (ref 15–500)
EOSINOPHIL NFR BLD AUTO: 2.8 %
ERYTHROCYTE [DISTWIDTH] IN BLOOD BY AUTOMATED COUNT: 12.5 % (ref 11–15)
GLOBULIN SER CALC-MCNC: 2.3 G/DL (CALC) (ref 1.9–3.7)
GLUCOSE SERPL-MCNC: 92 MG/DL (ref 65–99)
GLUCOSE UR QL STRIP: NEGATIVE
HCT VFR BLD AUTO: 45.7 % (ref 38.5–50)
HDLC SERPL-MCNC: 50 MG/DL
HGB BLD-MCNC: 14.8 G/DL (ref 13.2–17.1)
HGB UR QL STRIP: NEGATIVE
HYALINE CASTS #/AREA URNS LPF: NORMAL /LPF
KETONES UR QL STRIP: NEGATIVE
LDLC SERPL CALC-MCNC: 138 MG/DL (CALC)
LEUKOCYTE ESTERASE UR QL STRIP: NEGATIVE
LYMPHOCYTES # BLD AUTO: 2353 CELLS/UL (ref 850–3900)
LYMPHOCYTES NFR BLD AUTO: 36.2 %
MCH RBC QN AUTO: 30.4 PG (ref 27–33)
MCHC RBC AUTO-ENTMCNC: 32.4 G/DL (ref 32–36)
MCV RBC AUTO: 93.8 FL (ref 80–100)
MONOCYTES # BLD AUTO: 644 CELLS/UL (ref 200–950)
MONOCYTES NFR BLD AUTO: 9.9 %
NEUTROPHILS # BLD AUTO: 3289 CELLS/UL (ref 1500–7800)
NEUTROPHILS NFR BLD AUTO: 50.6 %
NITRITE UR QL STRIP: NEGATIVE
NONHDLC SERPL-MCNC: 155 MG/DL (CALC)
PH UR STRIP: 5.5 [PH] (ref 5–8)
PLATELET # BLD AUTO: 154 THOUSAND/UL (ref 140–400)
PMV BLD REES-ECKER: 11.5 FL (ref 7.5–12.5)
POTASSIUM SERPL-SCNC: 4.3 MMOL/L (ref 3.5–5.3)
PROT SERPL-MCNC: 6.7 G/DL (ref 6.1–8.1)
PROT UR QL STRIP: NEGATIVE
RBC # BLD AUTO: 4.87 MILLION/UL (ref 4.2–5.8)
RBC #/AREA URNS HPF: NORMAL /HPF
SERVICE CMNT-IMP: NORMAL
SODIUM SERPL-SCNC: 140 MMOL/L (ref 135–146)
SP GR UR STRIP: 1.02 (ref 1–1.03)
SQUAMOUS #/AREA URNS HPF: NORMAL /HPF
TRIGL SERPL-MCNC: 76 MG/DL
TSH SERPL-ACNC: 2.42 MIU/L (ref 0.4–4.5)
WBC # BLD AUTO: 6.5 THOUSAND/UL (ref 3.8–10.8)
WBC #/AREA URNS HPF: NORMAL /HPF

## 2024-07-16 ENCOUNTER — TELEPHONE (OUTPATIENT)
Dept: FAMILY MEDICINE | Facility: CLINIC | Age: 54
End: 2024-07-16

## 2024-07-16 ENCOUNTER — OFFICE VISIT (OUTPATIENT)
Dept: FAMILY MEDICINE | Facility: CLINIC | Age: 54
End: 2024-07-16
Payer: COMMERCIAL

## 2024-07-16 VITALS
SYSTOLIC BLOOD PRESSURE: 121 MMHG | BODY MASS INDEX: 28.31 KG/M2 | WEIGHT: 209 LBS | DIASTOLIC BLOOD PRESSURE: 63 MMHG | RESPIRATION RATE: 18 BRPM | HEART RATE: 84 BPM | HEIGHT: 72 IN | OXYGEN SATURATION: 96 %

## 2024-07-16 DIAGNOSIS — M72.0 DUPUYTREN'S CONTRACTURE: ICD-10-CM

## 2024-07-16 DIAGNOSIS — B35.1 ONYCHOMYCOSIS: ICD-10-CM

## 2024-07-16 DIAGNOSIS — Z00.00 ROUTINE PHYSICAL EXAMINATION: ICD-10-CM

## 2024-07-16 DIAGNOSIS — B35.1 ONYCHOMYCOSIS: Primary | ICD-10-CM

## 2024-07-16 DIAGNOSIS — M65.4 TENDINITIS, DE QUERVAIN'S: ICD-10-CM

## 2024-07-16 PROCEDURE — 3008F BODY MASS INDEX DOCD: CPT | Mod: CPTII,S$GLB,, | Performed by: PHYSICIAN ASSISTANT

## 2024-07-16 PROCEDURE — 3074F SYST BP LT 130 MM HG: CPT | Mod: CPTII,S$GLB,, | Performed by: PHYSICIAN ASSISTANT

## 2024-07-16 PROCEDURE — 1159F MED LIST DOCD IN RCRD: CPT | Mod: CPTII,S$GLB,, | Performed by: PHYSICIAN ASSISTANT

## 2024-07-16 PROCEDURE — 3078F DIAST BP <80 MM HG: CPT | Mod: CPTII,S$GLB,, | Performed by: PHYSICIAN ASSISTANT

## 2024-07-16 PROCEDURE — 99396 PREV VISIT EST AGE 40-64: CPT | Mod: S$GLB,,, | Performed by: PHYSICIAN ASSISTANT

## 2024-07-16 NOTE — PROGRESS NOTES
SUBJECTIVE:    Patient ID: Stephen Deleon III is a 54 y.o. male.    Chief Complaint: Annual Exam (Annual visit // no bottles// refills needed would like paper rx// pt states he's been having off and on pain in right wrist level 6 going on for 2 months )    55 yo male presents for regular checkup and refills (Annual). Reports doing well. Being treated for onycomycosis with Dr. Giron. Jublia is effective for the great toenail on the right. He does have significant radial styloid tenderness that has gotten worse over the last year. Has been using a thumb spica splint at night that has helped some but still very tender and limiting his activities.         Telephone on 04/30/2024   Component Date Value Ref Range Status    Color, UA 05/01/2024 YELLOW  YELLOW Final    Appearance, UA 05/01/2024 CLEAR  CLEAR Final    Specific Gravity, UA 05/01/2024 1.019  1.001 - 1.035 Final    pH, UA 05/01/2024 5.5  5.0 - 8.0 Final    Glucose, UA 05/01/2024 NEGATIVE  NEGATIVE Final    Bilirubin, UA 05/01/2024 NEGATIVE  NEGATIVE Final    Ketones, UA 05/01/2024 NEGATIVE  NEGATIVE Final    Occult Blood UA 05/01/2024 NEGATIVE  NEGATIVE Final    Protein, UA 05/01/2024 NEGATIVE  NEGATIVE Final    Nitrite, UA 05/01/2024 NEGATIVE  NEGATIVE Final    Leukocytes, UA 05/01/2024 NEGATIVE  NEGATIVE Final    WBC Casts, UA 05/01/2024 NONE SEEN  < OR = 5 /HPF Final    RBC Casts, UA 05/01/2024 NONE SEEN  < OR = 2 /HPF Final    Squam Epithel, UA 05/01/2024 NONE SEEN  < OR = 5 /HPF Final    Bacteria, UA 05/01/2024 NONE SEEN  NONE SEEN /HPF Final    Hyaline Casts, UA 05/01/2024 NONE SEEN  NONE SEEN /LPF Final    Service Cmt: 05/01/2024    Final    Reflexive Urine Culture 05/01/2024    Final    TSH w/reflex to FT4 05/01/2024 2.42  0.40 - 4.50 mIU/L Final    Cholesterol 05/01/2024 205 (H)  <200 mg/dL Final    HDL 05/01/2024 50  > OR = 40 mg/dL Final    Triglycerides 05/01/2024 76  <150 mg/dL Final    LDL Cholesterol 05/01/2024 138 (H)  mg/dL (calc) Final     HDL/Cholesterol Ratio 05/01/2024 4.1  <5.0 (calc) Final    Non HDL Chol. (LDL+VLDL) 05/01/2024 155 (H)  <130 mg/dL (calc) Final    WBC 05/01/2024 6.5  3.8 - 10.8 Thousand/uL Final    RBC 05/01/2024 4.87  4.20 - 5.80 Million/uL Final    Hemoglobin 05/01/2024 14.8  13.2 - 17.1 g/dL Final    Hematocrit 05/01/2024 45.7  38.5 - 50.0 % Final    MCV 05/01/2024 93.8  80.0 - 100.0 fL Final    MCH 05/01/2024 30.4  27.0 - 33.0 pg Final    MCHC 05/01/2024 32.4  32.0 - 36.0 g/dL Final    RDW 05/01/2024 12.5  11.0 - 15.0 % Final    Platelets 05/01/2024 154  140 - 400 Thousand/uL Final    MPV 05/01/2024 11.5  7.5 - 12.5 fL Final    Neutrophils, Abs 05/01/2024 3,289  1,500 - 7,800 cells/uL Final    Lymph # 05/01/2024 2,353  850 - 3,900 cells/uL Final    Mono # 05/01/2024 644  200 - 950 cells/uL Final    Eos # 05/01/2024 182  15 - 500 cells/uL Final    Baso # 05/01/2024 33  0 - 200 cells/uL Final    Neutrophils Relative 05/01/2024 50.6  % Final    Lymph % 05/01/2024 36.2  % Final    Mono % 05/01/2024 9.9  % Final    Eosinophil % 05/01/2024 2.8  % Final    Basophil % 05/01/2024 0.5  % Final    Glucose 05/01/2024 92  65 - 99 mg/dL Final    BUN 05/01/2024 16  7 - 25 mg/dL Final    Creatinine 05/01/2024 0.95  0.70 - 1.30 mg/dL Final    eGFR 05/01/2024 95  > OR = 60 mL/min/1.73m2 Final    BUN/Creatinine Ratio 05/01/2024 SEE NOTE:  6 - 22 (calc) Final    Sodium 05/01/2024 140  135 - 146 mmol/L Final    Potassium 05/01/2024 4.3  3.5 - 5.3 mmol/L Final    Chloride 05/01/2024 106  98 - 110 mmol/L Final    CO2 05/01/2024 25  20 - 32 mmol/L Final    Calcium 05/01/2024 9.2  8.6 - 10.3 mg/dL Final    Total Protein 05/01/2024 6.7  6.1 - 8.1 g/dL Final    Albumin 05/01/2024 4.4  3.6 - 5.1 g/dL Final    Globulin, Total 05/01/2024 2.3  1.9 - 3.7 g/dL (calc) Final    Albumin/Globulin Ratio 05/01/2024 1.9  1.0 - 2.5 (calc) Final    Total Bilirubin 05/01/2024 0.7  0.2 - 1.2 mg/dL Final    Alkaline Phosphatase 05/01/2024 88  35 - 144 U/L Final    AST  05/01/2024 12  10 - 35 U/L Final    ALT 05/01/2024 14  9 - 46 U/L Final       History reviewed. No pertinent past medical history.  History reviewed. No pertinent surgical history.  No family history on file.    Marital Status:   Alcohol History:  reports current alcohol use.  Tobacco History:  reports that he has never smoked. He has never been exposed to tobacco smoke. He does not have any smokeless tobacco history on file.  Drug History:  reports no history of drug use.    Review of patient's allergies indicates:  No Known Allergies    Current Outpatient Medications:     efinaconazole (JUBLIA) 10 % Jaz, Apply 1 Application topically once daily., Disp: 4 mL, Rfl: 5    Review of Systems   Constitutional:  Negative for activity change, fatigue, fever and unexpected weight change.   HENT:  Negative for congestion.    Respiratory:  Negative for apnea, cough, chest tightness and shortness of breath.    Cardiovascular:  Negative for chest pain and palpitations.   Gastrointestinal:  Negative for abdominal distention and abdominal pain.   Genitourinary:  Negative for difficulty urinating and dysuria.   Musculoskeletal:  Negative for arthralgias and back pain.   Neurological:  Negative for dizziness and weakness.          Objective:      Vitals:    07/16/24 0806   BP: 121/63   Pulse: 84   Resp: 18   SpO2: 96%   Weight: 94.8 kg (209 lb)   Height: 6' (1.829 m)     Physical Exam  Constitutional:       General: He is not in acute distress.     Appearance: He is well-developed.   HENT:      Head: Normocephalic and atraumatic.   Eyes:      Pupils: Pupils are equal, round, and reactive to light.   Neck:      Thyroid: No thyromegaly.   Cardiovascular:      Rate and Rhythm: Normal rate and regular rhythm.      Heart sounds: Normal heart sounds.   Pulmonary:      Effort: Pulmonary effort is normal.      Breath sounds: Normal breath sounds.   Abdominal:      General: Bowel sounds are normal. There is no distension.       Palpations: Abdomen is soft.      Tenderness: There is no abdominal tenderness.   Musculoskeletal:         General: Normal range of motion.      Cervical back: Normal range of motion and neck supple.      Comments: RT wrist: tenderness to the radial styloid   Skin:     General: Skin is warm and dry.      Findings: No erythema or rash.   Neurological:      Mental Status: He is alert and oriented to person, place, and time.      Cranial Nerves: No cranial nerve deficit.           Assessment:       1. Onychomycosis    2. Tendinitis, de Quervain's    3. Dupuytren's contracture    4. Routine physical examination         Plan:       Onychomycosis  Comments:  continue with current treatment. refills today  Orders:  -     efinaconazole (JUBLIA) 10 % Jaz; Apply 1 Application topically once daily.  Dispense: 4 mL; Refill: 5    Tendinitis, de Quervain's  Comments:  severe and worsening. will refer to Dr. Pimentel to discuss this as well as the duputrens  Orders:  -     Ambulatory referral/consult to Hand Surgery; Future; Expected date: 07/16/2024    Dupuytren's contracture    Routine physical examination  Comments:  Very healthy individual. continue as is. labs reviewed.      Follow up in about 1 year (around 7/16/2025) for Annual Physical.        7/16/2024 Donta Urbina PA-C

## 2024-07-16 NOTE — TELEPHONE ENCOUNTER
----- Message from Meera Mims sent at 7/16/2024  8:55 AM CDT -----  Pt would like his Jublia to be sent to [facility] [address] [place] [address] 11.    [phone]

## 2024-07-16 NOTE — TELEPHONE ENCOUNTER
----- Message from Kallie Marie sent at 7/16/2024 11:55 AM CDT -----  Tu from [facility] pharmacy Calling to verify a prescription.   [phone]

## 2025-06-24 ENCOUNTER — TELEPHONE (OUTPATIENT)
Dept: FAMILY MEDICINE | Facility: CLINIC | Age: 55
End: 2025-06-24
Payer: COMMERCIAL

## 2025-06-24 DIAGNOSIS — Z13.6 ENCOUNTER FOR SCREENING FOR CARDIOVASCULAR DISORDERS: ICD-10-CM

## 2025-06-24 DIAGNOSIS — Z82.49 FAMILY HISTORY OF HEART DISEASE: ICD-10-CM

## 2025-06-24 DIAGNOSIS — Z00.00 ROUTINE PHYSICAL EXAMINATION: Primary | ICD-10-CM

## 2025-07-17 ENCOUNTER — OFFICE VISIT (OUTPATIENT)
Dept: FAMILY MEDICINE | Facility: CLINIC | Age: 55
End: 2025-07-17
Payer: COMMERCIAL

## 2025-07-17 VITALS
HEART RATE: 78 BPM | BODY MASS INDEX: 28.31 KG/M2 | DIASTOLIC BLOOD PRESSURE: 68 MMHG | RESPIRATION RATE: 18 BRPM | SYSTOLIC BLOOD PRESSURE: 112 MMHG | HEIGHT: 72 IN | WEIGHT: 209 LBS | OXYGEN SATURATION: 96 %

## 2025-07-17 DIAGNOSIS — Z12.5 SCREENING FOR PROSTATE CANCER: ICD-10-CM

## 2025-07-17 DIAGNOSIS — M72.0 DUPUYTREN'S CONTRACTURE: ICD-10-CM

## 2025-07-17 DIAGNOSIS — Z00.00 ROUTINE PHYSICAL EXAMINATION: Primary | ICD-10-CM

## 2025-07-17 DIAGNOSIS — Z82.49 FAMILY HISTORY OF HEART DISEASE: ICD-10-CM

## 2025-07-17 PROCEDURE — 99396 PREV VISIT EST AGE 40-64: CPT | Mod: S$GLB,,, | Performed by: PHYSICIAN ASSISTANT

## 2025-07-17 PROCEDURE — 3074F SYST BP LT 130 MM HG: CPT | Mod: CPTII,S$GLB,, | Performed by: PHYSICIAN ASSISTANT

## 2025-07-17 PROCEDURE — 3008F BODY MASS INDEX DOCD: CPT | Mod: CPTII,S$GLB,, | Performed by: PHYSICIAN ASSISTANT

## 2025-07-17 PROCEDURE — 3078F DIAST BP <80 MM HG: CPT | Mod: CPTII,S$GLB,, | Performed by: PHYSICIAN ASSISTANT

## 2025-07-17 PROCEDURE — 1159F MED LIST DOCD IN RCRD: CPT | Mod: CPTII,S$GLB,, | Performed by: PHYSICIAN ASSISTANT

## 2025-07-17 NOTE — PROGRESS NOTES
Dictation #1  MRN:[id number]  [facility]:[id number]   SUBJECTIVE:    Patient ID: Stephen Deleon III is a 55 y.o. male.    Chief Complaint: Annual Exam (Annual visit// no bottles// no refills needed// pt states he have no complaints today )    History of Present Illness    CHIEF COMPLAINT:  Stephen presents today for annual follow-up.    ALLERGIES:  He reports this year has been the worst for allergies in 10-12 years. He takes guaifenesin 200mg as needed for mucus relief, which he finds effective and fast-acting.    MUSCULOSKELETAL:  He has a history of de Quervain's tenosynovitis which resolved without intervention and Dupuytren's contracture with no significant progression. He takes Aleve as needed for joint pain. He previously experienced wrist pain from yard work that persisted for 6 months requiring nighttime bracing. He reports knee concerns from an injury 5 years ago and is currently hesitant to jog due to potential pain. He manages knee discomfort by using a stationary bike and avoiding high-impact activities.    FAMILY HISTORY:  Brother has significant cardiac history including 5 stents placed at age 49 and quad-bypass surgery. Brother has been on statins since age 39 and recently started PCSK9 inhibitor Repatha after failing statin therapy. Brother has smoking history from age 20-35.    IMAGING:  CT calcium score was zero, indicating no detectable coronary artery calcification.    WEIGHT MANAGEMENT:  He reports recent 3-pound weight gain following a cruise vacation and expresses motivation to monitor his weight.      ROS:  General: -fever, -chills, -fatigue, -weight gain, -weight loss  Eyes: -vision changes, -redness, -discharge  ENT: -ear pain, -nasal congestion, -sore throat  Cardiovascular: -chest pain, -palpitations, -lower extremity edema  Respiratory: -cough, -shortness of breath  Gastrointestinal: -abdominal pain, -nausea, -vomiting, -diarrhea, -constipation, -blood in stool  Genitourinary: -dysuria,  -hematuria, -frequency  Musculoskeletal: +joint pain, -muscle pain  Skin: -rash, -lesion  Neurological: -headache, -dizziness, -numbness, -tingling  Psychiatric: -anxiety, -depression, -sleep difficulty  Allergic: +seasonal allergies         No visits with results within 6 Month(s) from this visit.   Latest known visit with results is:   Telephone on 04/30/2024   Component Date Value Ref Range Status    Color, UA 05/01/2024 YELLOW  YELLOW Final    Appearance, UA 05/01/2024 CLEAR  CLEAR Final    Specific Gravity, UA 05/01/2024 1.019  1.001 - 1.035 Final    pH, UA 05/01/2024 5.5  5.0 - 8.0 Final    Glucose, UA 05/01/2024 NEGATIVE  NEGATIVE Final    Bilirubin, UA 05/01/2024 NEGATIVE  NEGATIVE Final    Ketones, UA 05/01/2024 NEGATIVE  NEGATIVE Final    Occult Blood UA 05/01/2024 NEGATIVE  NEGATIVE Final    Protein, UA 05/01/2024 NEGATIVE  NEGATIVE Final    Nitrite, UA 05/01/2024 NEGATIVE  NEGATIVE Final    Leukocytes, UA 05/01/2024 NEGATIVE  NEGATIVE Final    WBC Casts, UA 05/01/2024 NONE SEEN  < OR = 5 /HPF Final    RBC Casts, UA 05/01/2024 NONE SEEN  < OR = 2 /HPF Final    Squam Epithel, UA 05/01/2024 NONE SEEN  < OR = 5 /HPF Final    Bacteria, UA 05/01/2024 NONE SEEN  NONE SEEN /HPF Final    Hyaline Casts, UA 05/01/2024 NONE SEEN  NONE SEEN /LPF Final    Service Cmt: 05/01/2024    Final    Reflexive Urine Culture 05/01/2024    Final    TSH w/reflex to FT4 05/01/2024 2.42  0.40 - 4.50 mIU/L Final    Cholesterol 05/01/2024 205 (H)  <200 mg/dL Final    HDL 05/01/2024 50  > OR = 40 mg/dL Final    Triglycerides 05/01/2024 76  <150 mg/dL Final    LDL Cholesterol 05/01/2024 138 (H)  mg/dL (calc) Final    HDL/Cholesterol Ratio 05/01/2024 4.1  <5.0 (calc) Final    Non HDL Chol. (LDL+VLDL) 05/01/2024 155 (H)  <130 mg/dL (calc) Final    WBC 05/01/2024 6.5  3.8 - 10.8 Thousand/uL Final    RBC 05/01/2024 4.87  4.20 - 5.80 Million/uL Final    Hemoglobin 05/01/2024 14.8  13.2 - 17.1 g/dL Final    Hematocrit 05/01/2024 45.7  38.5 -  50.0 % Final    MCV 05/01/2024 93.8  80.0 - 100.0 fL Final    MCH 05/01/2024 30.4  27.0 - 33.0 pg Final    MCHC 05/01/2024 32.4  32.0 - 36.0 g/dL Final    RDW 05/01/2024 12.5  11.0 - 15.0 % Final    Platelets 05/01/2024 154  140 - 400 Thousand/uL Final    MPV 05/01/2024 11.5  7.5 - 12.5 fL Final    Neutrophils, Abs 05/01/2024 3,289  1,500 - 7,800 cells/uL Final    Lymph # 05/01/2024 2,353  850 - 3,900 cells/uL Final    Mono # 05/01/2024 644  200 - 950 cells/uL Final    Eos # 05/01/2024 182  15 - 500 cells/uL Final    Baso # 05/01/2024 33  0 - 200 cells/uL Final    Neutrophils Relative 05/01/2024 50.6  % Final    Lymph % 05/01/2024 36.2  % Final    Mono % 05/01/2024 9.9  % Final    Eosinophil % 05/01/2024 2.8  % Final    Basophil % 05/01/2024 0.5  % Final    Glucose 05/01/2024 92  65 - 99 mg/dL Final    BUN 05/01/2024 16  7 - 25 mg/dL Final    Creatinine 05/01/2024 0.95  0.70 - 1.30 mg/dL Final    eGFR 05/01/2024 95  > OR = 60 mL/min/1.73m2 Final    BUN/Creatinine Ratio 05/01/2024 SEE NOTE:  6 - 22 (calc) Final    Sodium 05/01/2024 140  135 - 146 mmol/L Final    Potassium 05/01/2024 4.3  3.5 - 5.3 mmol/L Final    Chloride 05/01/2024 106  98 - 110 mmol/L Final    CO2 05/01/2024 25  20 - 32 mmol/L Final    Calcium 05/01/2024 9.2  8.6 - 10.3 mg/dL Final    Total Protein 05/01/2024 6.7  6.1 - 8.1 g/dL Final    Albumin 05/01/2024 4.4  3.6 - 5.1 g/dL Final    Globulin, Total 05/01/2024 2.3  1.9 - 3.7 g/dL (calc) Final    Albumin/Globulin Ratio 05/01/2024 1.9  1.0 - 2.5 (calc) Final    Total Bilirubin 05/01/2024 0.7  0.2 - 1.2 mg/dL Final    Alkaline Phosphatase 05/01/2024 88  35 - 144 U/L Final    AST 05/01/2024 12  10 - 35 U/L Final    ALT 05/01/2024 14  9 - 46 U/L Final       History reviewed. No pertinent past medical history.  History reviewed. No pertinent surgical history.  No family history on file.    Marital Status:   Alcohol History:  reports current alcohol use.  Tobacco History:  reports that he has  never smoked. He has never been exposed to tobacco smoke. He does not have any smokeless tobacco history on file.  Drug History:  reports no history of drug use.    Review of patient's allergies indicates:  No Known Allergies  Current Medications[1]    Objective:      Vitals:    07/17/25 0809   BP: 112/68   Pulse: 78   Resp: 18   SpO2: 96%   Weight: 94.8 kg (209 lb)   Height: 6' (1.829 m)     Physical Exam    General: No acute distress. Well-developed. Well-nourished.  Eyes: EOMI. Sclerae anicteric.  HENT: Normocephalic. Atraumatic. Nares patent. Moist oral mucosa.  Ears: Bilateral TMs clear. Bilateral EACs clear.  Cardiovascular: Regular rate. Regular rhythm. No murmurs. No rubs. No gallops. Normal S1, S2.  Respiratory: Normal respiratory effort. Clear to auscultation bilaterally. No rales. No rhonchi. No wheezing.  Abdomen: Soft. Non-tender. Non-distended. Normoactive bowel sounds.  Musculoskeletal: No  obvious deformity.  Extremities: No lower extremity edema.  Neurological: Alert & oriented x3. No slurred speech. Normal gait.  Psychiatric: Normal mood. Normal affect. Good insight. Good judgment.  Skin: Warm. Dry. No rash.         Assessment:       Assessment & Plan    - Reviewed cardiovascular risk factors, including borderline cholesterol levels.  - CT calcium score from 2023 showed a score of zero, indicating low cardiovascular risk.  - Family history of cardiovascular disease, particularly brother's experience with CAD and stent placement.  - XR Knee from previous visit showed some medial narrowing but not severe.  - Overall health status without significant concerns.    ## DUPUYTREN'S CONTRACTURE:  - Monitored the patient's Dupuytren's contracture, which shows no progression in the fibrotic nodules.  - Discussed family history, noting mother has a cousin who had severe contracture.    ## ALLERGIC RHINITIS:  - Monitored the patient's persistent allergies, which have worsened this year, particularly after  exposure to hydrangeas.  - Discussed environmental triggers like Ligustrum trees.  - Stephen uses Aleve for joint pain and guaifenesin for mucus relief as needed for allergy symptoms.    ## KNEE PAIN:  - Monitored knee pain which is especially noticeable after jogging, with previous X-rays showing some medial narrowing.  - Stephen has a history of knee injury and family history of knee problems.  - Recommend lower-impact exercises like stationary biking or fast walking instead of running to protect knee health.    ## FAMILY HISTORY OF ISCHEMIC HEART DISEASE:  - Discussed family history of ischemic heart disease, noting brother had 99% blockage and 5 stents at age 49.  - Explained the significance of CT calcium score in assessing cardiovascular risk and the impact of smoking on arterial health, specifically how nicotine can alter the endothelium of arteries and increase risk.    ## WRIST PAIN (DE QUERVAIN'S TENOSYNOVITIS):  - Monitored wrist pain that occurs with specific movements like using a .  - The pain lasted 6 months and required a brace for relief.  - Discussed potential causes including lifting children.    ## FOLLOW-UP AND LAB TESTS:  - Ordered annual labs, including lipid panel and PSA test.  - Stephen to complete lab work at [facility] a few weeks after returning from vacation to ensure more accurate results and return to normal routine.  - Stephen should ensure all 6 ordered labs are completed, including the newly added PSA test.  - Contact the office to review lab results once completed.  - Follow up in 1 year for routine visit, unless any urgent issues arise.       Plan:       Routine physical examination    Screening for prostate cancer  -     PSA, SCREENING; Future; Expected date: 07/17/2025    Family history of heart disease    Dupuytren's contracture      Follow up in about 1 year (around 7/17/2026) for Annual Physical.    This note was generated with the assistance of ambient listening  technology. Verbal consent was obtained by the patient and accompanying visitor(s) for the recording of patient appointment to facilitate this note. I attest to having reviewed and edited the generated note for accuracy, though some syntax or spelling errors may persist. Please contact the author of this note for any clarification.          7/17/2025 Donta Urbina PA-C           [1] No current outpatient medications on file.

## 2025-08-20 ENCOUNTER — TELEPHONE (OUTPATIENT)
Dept: FAMILY MEDICINE | Facility: CLINIC | Age: 55
End: 2025-08-20
Payer: COMMERCIAL